# Patient Record
(demographics unavailable — no encounter records)

---

## 2025-05-22 NOTE — DATA REVIEWED
[de-identified] : An audiogram was ordered and performed including tympanometry, pure tones and speech, for patient's complaint of hearing loss, vertigo I have independently reviewed the patient's audiogram from today and my findings include R SNHL with 0% SDS  [de-identified] : per pt, she had an MRI at [facility],which was negative

## 2025-05-22 NOTE — PROCEDURE
[Other ___] : [unfilled] [Same] : same as the Pre Op Dx. [] : Binocular Microscopy [FreeTextEntry1] : Right sided hearing loss and bilateral tinnitus [FreeTextEntry4] : none [FreeTextEntry6] : Operative microscope was used to examine the ear canal, ear drum and visible middle ear landmarks. Adequate exam would not have been possible without the use of a microscope. Findings are described.

## 2025-05-22 NOTE — HISTORY OF PRESENT ILLNESS
[de-identified] : 60 year old woman with h/o bilateral tinnitus and right sided hearing loss that started in 10/2020 presents for folow-up for intermittent Left ear fullness. Last seen 11/15/2022 - CI evaluation was done 12/19/2022 No hearing changes in the Left ear.  States Right tinnitus is none bothersome and is constant. Denies otalgia, otorrhea, recent fevers/ear infections, dizziness, vertigo, headaches related to hearing.